# Patient Record
Sex: FEMALE | Race: WHITE | URBAN - METROPOLITAN AREA
[De-identification: names, ages, dates, MRNs, and addresses within clinical notes are randomized per-mention and may not be internally consistent; named-entity substitution may affect disease eponyms.]

---

## 2017-01-24 ENCOUNTER — OFFICE VISIT (OUTPATIENT)
Dept: RHEUMATOLOGY | Facility: CLINIC | Age: 52
End: 2017-01-24

## 2017-01-24 VITALS
HEART RATE: 76 BPM | BODY MASS INDEX: 36 KG/M2 | DIASTOLIC BLOOD PRESSURE: 84 MMHG | RESPIRATION RATE: 16 BRPM | WEIGHT: 215 LBS | SYSTOLIC BLOOD PRESSURE: 126 MMHG

## 2017-01-24 DIAGNOSIS — M35.00 SJOGREN'S DISEASE (HCC): ICD-10-CM

## 2017-01-24 DIAGNOSIS — M32.0 DRUG-INDUCED SYSTEMIC LUPUS ERYTHEMATOSUS, UNSPECIFIED ORGAN INVOLVEMENT STATUS (HCC): ICD-10-CM

## 2017-01-24 DIAGNOSIS — M79.7 FIBROMYALGIA: Primary | ICD-10-CM

## 2017-01-24 PROBLEM — E66.9 OBESITY: Status: ACTIVE | Noted: 2017-01-24

## 2017-01-24 PROBLEM — E11.9 DIABETES MELLITUS (HCC): Status: ACTIVE | Noted: 2017-01-24

## 2017-01-24 PROCEDURE — 99214 OFFICE O/P EST MOD 30 MIN: CPT | Performed by: INTERNAL MEDICINE

## 2017-01-24 RX ORDER — MELOXICAM 7.5 MG/1
7.5 TABLET ORAL 2 TIMES DAILY
Qty: 180 TABLET | Refills: 3 | Status: SHIPPED | OUTPATIENT
Start: 2017-01-24

## 2017-01-24 RX ORDER — CYCLOBENZAPRINE HCL 10 MG
10 TABLET ORAL 3 TIMES DAILY
Qty: 270 TABLET | Refills: 3 | Status: SHIPPED | OUTPATIENT
Start: 2017-01-24 | End: 2017-04-05

## 2017-01-24 RX ORDER — LEFLUNOMIDE 10 MG/1
10 TABLET ORAL DAILY
Qty: 90 TABLET | Refills: 3 | Status: SHIPPED | OUTPATIENT
Start: 2017-01-24

## 2017-01-24 NOTE — PROGRESS NOTES
[facility]  Dr. Idania Strange Progress Note     Subjective: Valeria Palmer is a(n) 54year old female. Current complaints: Patient presents with:  Lupus: 3 month f/u. 12/19/16 labs ESR 23.    Sjogren's Syndrome  Fibromyalgia Syndrome  Medication Exercises tolerated. See Dr. Iftikhar Lyons in 3 months with labs before next visit. 25 minute visit with over half the time face-to-face discussing issues of lupus, fibromyalgia and diet.         Debby Estrada MD 2/27/6132 2:41 PM

## 2017-01-24 NOTE — PATIENT INSTRUCTIONS
Current instructions are to take the leflunomide 10 mg every other day and the meloxicam to 7.5 mg once a day for treatment of underlying lupus and Sjogren's.   Flexeril also known as cyclobenzaprine should be taken 1 tablet at nighttime and maybe once duri

## 2017-03-16 RX ORDER — CYCLOBENZAPRINE HCL 10 MG
TABLET ORAL
Qty: 180 TABLET | Refills: 0 | OUTPATIENT
Start: 2017-03-16

## 2017-04-05 RX ORDER — CYCLOBENZAPRINE HCL 10 MG
10 TABLET ORAL 3 TIMES DAILY
Qty: 270 TABLET | Refills: 3 | Status: SHIPPED | OUTPATIENT
Start: 2017-04-05

## 2017-04-05 NOTE — TELEPHONE ENCOUNTER
Patient called in regards to her Rx being sent to the wrong pharmacy. Med reordered, pharmacy corrected, please sign.

## 2017-05-16 ENCOUNTER — TELEPHONE (OUTPATIENT)
Dept: RHEUMATOLOGY | Facility: CLINIC | Age: 52
End: 2017-05-16

## 2017-05-16 NOTE — TELEPHONE ENCOUNTER
She was called today.   Recent lab results from [facility] showed a normal CBC normal sed rate of 26 random glucose elevated at 160 she is on diabetes treatment and sees her endocrinologist.  However I am concerned because her ALT liver test

## 2017-06-14 ENCOUNTER — TELEPHONE (OUTPATIENT)
Dept: RHEUMATOLOGY | Facility: CLINIC | Age: 52
End: 2017-06-14

## (undated) NOTE — MR AVS SNAPSHOT
[facility]  [address] [facility],[address], [address]  [address] [address] [phone]               Thank you for choosing us for your health care visit with Pauline Villalpando MD.  We are glad to serve you and happy to provide you with this This list is accurate as of: 1/24/17  3:02 PM.  Always use your most recent med list.                Atorvastatin Calcium 10 MG Tabs   Take 10 mg by mouth nightly.    Commonly known as:  LIPITOR           Calcium & Magnesium Carbonates 311-232 MG Tabs   [facility] CBC W Differential W Platelet    Complete by:  Jan 24, 2017 (Approximate)    Assoc Dx:  Fibromyalgia [M79.7], Drug-induced systemic lupus erythematosus, unspecified organ involvement status [M32.0]           Sed Rate, Westergren (Automated) [E]    Complet 2 ½ hours per week – spread out over time Use a shayan to keep you motivated   Don’t forget strength training with weights and resistance Set goals and track your progress   You don’t need to join a gym. Home exercises work great.  Put more priority on exe